# Patient Record
Sex: FEMALE | Race: WHITE | URBAN - METROPOLITAN AREA
[De-identification: names, ages, dates, MRNs, and addresses within clinical notes are randomized per-mention and may not be internally consistent; named-entity substitution may affect disease eponyms.]

---

## 2017-01-31 ENCOUNTER — TELEPHONE (OUTPATIENT)
Dept: FAMILY MEDICINE | Facility: CLINIC | Age: 72
End: 2017-01-31

## 2017-01-31 DIAGNOSIS — E78.5 HYPERLIPIDEMIA LDL GOAL <100: Primary | ICD-10-CM

## 2017-01-31 NOTE — TELEPHONE ENCOUNTER
Reason for call: Medication   If this is a refill request, has the caller requested the refill from the pharmacy already? Yes  Will the patient be using a [facility]? No  Name of the pharmacy and phone number for the current request: [facility] [phone]    Name of the medication requested: simvastatin 40 mg insurance is not covering this medication and was told by pharmacy to contact provider to prescribed an alternative medication     Other request:     Phone Number Pt can be reached at: Cell number on file:    Telephone Information:   Mobile [phone]     Best Time: anytime  Can we leave a detailed message on this number? YES

## 2017-01-31 NOTE — TELEPHONE ENCOUNTER
Called patient and requested patient call insurance for alternative formulary options and return call to clinic - provider then will provider opinion on best option    Katie Bowen RN

## 2017-01-31 NOTE — TELEPHONE ENCOUNTER
Spoke with patient; she mentioned her insurance company will allow the prescription to be filled with a 's prior authorization regarding simvastatin. Please call patient back to re-verify

## 2017-02-01 NOTE — TELEPHONE ENCOUNTER
"Discussed with Dr Wegener, call patient to see what other medications are covered.     Staff called and spoke with patient, relayed message. Patient states she called and spoke with her insurance already. Her insurance states \"Dr Wegener needs to call [phone] saying that patient needs to take Simvastatin and the medication will be approved for 1 year, #45 per month.\"    Routing to PCP. Please advise, do you want staff to call patient's insurance?    Concepcion Yanes MA      "

## 2017-02-01 NOTE — TELEPHONE ENCOUNTER
Routing to [facility] and [place] pool.    Dr. Wegener-Please review.  Would you like prior auth to be started for Simvastatin 40 mg?    MA-If indicated by Dr. Wegener, please start prior auth.    Thank you!  SHE PatelN, RN

## 2017-02-02 RX ORDER — SIMVASTATIN 40 MG
40 TABLET ORAL DAILY
Qty: 90 TABLET | Refills: 3 | Status: SHIPPED | OUTPATIENT
Start: 2017-02-02 | End: 2017-09-29

## 2017-02-02 NOTE — TELEPHONE ENCOUNTER
I called and discussed with the patient.     It appears it is a quantity limit since taking two one day and one the other.     Reviewed last lipids which were excellent, no h/o MI or stroke.     Will change dose to 40mg daily and see if prior auth still needed.     Joel Wegener,MD

## 2017-04-27 DIAGNOSIS — R00.2 PALPITATIONS: ICD-10-CM

## 2017-04-27 NOTE — TELEPHONE ENCOUNTER
metoprolol (LOPRESSOR) 25 MG tablet      Last Written Prescription Date: 4/11/16  Last Fill Quantity: 90, # refills: 3    Last Office Visit with [facility], [facility] or [facility] prescribing provider:  9/20/16   Future Office Visit:        BP Readings from Last 3 Encounters:   09/20/16 116/76   02/02/16 118/78   10/26/15 106/75

## 2017-04-28 RX ORDER — METOPROLOL TARTRATE 25 MG/1
TABLET, FILM COATED ORAL
Qty: 90 TABLET | Refills: 0 | Status: SHIPPED | OUTPATIENT
Start: 2017-04-28 | End: 2017-08-01

## 2017-07-21 DIAGNOSIS — E78.5 HYPERLIPIDEMIA LDL GOAL <100: ICD-10-CM

## 2017-07-21 RX ORDER — SIMVASTATIN 40 MG
TABLET ORAL
Qty: 30 TABLET | Refills: 0 | Status: SHIPPED | OUTPATIENT
Start: 2017-07-21 | End: 2017-08-26

## 2017-07-21 NOTE — TELEPHONE ENCOUNTER
Medication is being filled for 1 time refill only due to:  IN NEED OF PREVENTATIVE PHYSICAL AND LABS. Sabra Silva RN

## 2017-08-01 DIAGNOSIS — R00.2 PALPITATIONS: ICD-10-CM

## 2017-08-02 NOTE — TELEPHONE ENCOUNTER
metoprolol (LOPRESSOR) 25 MG tablet      Last Written Prescription Date: 04/28/17  Last Fill Quantity: 90, # refills: 0    Last Office Visit with [facility], [facility] or [facility] prescribing provider:  09/20/16   Future Office Visit:        BP Readings from Last 3 Encounters:   09/20/16 116/76   02/02/16 118/78   10/26/15 106/75

## 2017-08-03 RX ORDER — METOPROLOL TARTRATE 25 MG/1
TABLET, FILM COATED ORAL
Qty: 90 TABLET | Refills: 0 | Status: SHIPPED | OUTPATIENT
Start: 2017-08-03 | End: 2017-09-29

## 2017-08-03 NOTE — TELEPHONE ENCOUNTER
Routing refill request to provider for review/approval because:  -Associated diagnosis of palpitations not on [facility] refill protocol for this medication      Dr. Wegener-Please sign if agree.    Team coordinators-Please contact patient to schedule annual physical in September 2017.    Thank you!  SHE PatelN, RN

## 2017-08-26 DIAGNOSIS — E78.5 HYPERLIPIDEMIA LDL GOAL <100: ICD-10-CM

## 2017-08-28 RX ORDER — SIMVASTATIN 40 MG
TABLET ORAL
Qty: 30 TABLET | Refills: 0 | Status: SHIPPED | OUTPATIENT
Start: 2017-08-28 | End: 2017-09-29

## 2017-08-28 NOTE — TELEPHONE ENCOUNTER
Medication Detail      Disp Refills Start End FRANSICO   simvastatin (ZOCOR) 40 MG tablet 30 tablet 0 7/21/2017  No   Sig: TAKE 1 TAB BY MOUTH NIGHTLY ALTERNATING WITH 2 TABS NIGHTLY -1 ONE NIGHT, 2 THE NEXT, 1 THE NEXT ETC       Last Office Visit with [facility], [facility] or [facility] prescribing provider: 9/20/16       Lab Results   Component Value Date    CHOL 184 09/12/2014     Lab Results   Component Value Date    HDL 66 09/12/2014     Lab Results   Component Value Date    LDL 84 09/12/2014     Lab Results   Component Value Date    TRIG 168 09/12/2014     Lab Results   Component Value Date    CHOLHDLRATIO 2.8 09/12/2014

## 2017-08-28 NOTE — TELEPHONE ENCOUNTER
Routing refill request to provider for review/approval because:  Labs not current:  Lipid panel  -Marianne refill given and patient did not schedule office visit    Dr. Wegener-Please sign if agree.    Team coordinators-Please contact patient to schedule annual physical.    Thank you!  SHE PatelN, RN

## 2017-09-29 ENCOUNTER — OFFICE VISIT (OUTPATIENT)
Dept: FAMILY MEDICINE | Facility: CLINIC | Age: 72
End: 2017-09-29
Payer: MEDICARE

## 2017-09-29 VITALS
RESPIRATION RATE: 22 BRPM | SYSTOLIC BLOOD PRESSURE: 115 MMHG | TEMPERATURE: 99.1 F | BODY MASS INDEX: 38.24 KG/M2 | WEIGHT: 229.5 LBS | HEART RATE: 56 BPM | DIASTOLIC BLOOD PRESSURE: 66 MMHG | HEIGHT: 65 IN

## 2017-09-29 DIAGNOSIS — Z91.81 AT RISK FOR FALLING: ICD-10-CM

## 2017-09-29 DIAGNOSIS — R00.2 PALPITATIONS: ICD-10-CM

## 2017-09-29 DIAGNOSIS — Z00.01 ENCOUNTER FOR GENERAL ADULT MEDICAL EXAMINATION WITH ABNORMAL FINDINGS: Primary | ICD-10-CM

## 2017-09-29 DIAGNOSIS — M85.80 OSTEOPENIA, UNSPECIFIED LOCATION: ICD-10-CM

## 2017-09-29 DIAGNOSIS — Z78.0 ASYMPTOMATIC POSTMENOPAUSAL STATUS: ICD-10-CM

## 2017-09-29 DIAGNOSIS — Z78.0 MENOPAUSE: ICD-10-CM

## 2017-09-29 DIAGNOSIS — Z23 NEED FOR PROPHYLACTIC VACCINATION AND INOCULATION AGAINST INFLUENZA: ICD-10-CM

## 2017-09-29 DIAGNOSIS — E78.5 HYPERLIPIDEMIA LDL GOAL <100: ICD-10-CM

## 2017-09-29 LAB
ALBUMIN SERPL-MCNC: 3.9 G/DL (ref 3.4–5)
ALP SERPL-CCNC: 57 U/L (ref 40–150)
ALT SERPL W P-5'-P-CCNC: 23 U/L (ref 0–50)
ANION GAP SERPL CALCULATED.3IONS-SCNC: 7 MMOL/L (ref 3–14)
AST SERPL W P-5'-P-CCNC: 10 U/L (ref 0–45)
BILIRUB SERPL-MCNC: 0.7 MG/DL (ref 0.2–1.3)
BUN SERPL-MCNC: 19 MG/DL (ref 7–30)
CALCIUM SERPL-MCNC: 9.5 MG/DL (ref 8.5–10.1)
CHLORIDE SERPL-SCNC: 107 MMOL/L (ref 94–109)
CHOLEST SERPL-MCNC: 178 MG/DL
CO2 SERPL-SCNC: 28 MMOL/L (ref 20–32)
CREAT SERPL-MCNC: 0.74 MG/DL (ref 0.52–1.04)
GFR SERPL CREATININE-BSD FRML MDRD: 77 ML/MIN/1.7M2
GLUCOSE SERPL-MCNC: 104 MG/DL (ref 70–99)
HDLC SERPL-MCNC: 57 MG/DL
LDLC SERPL CALC-MCNC: 87 MG/DL
NONHDLC SERPL-MCNC: 121 MG/DL
POTASSIUM SERPL-SCNC: 4.2 MMOL/L (ref 3.4–5.3)
PROT SERPL-MCNC: 7.1 G/DL (ref 6.8–8.8)
SODIUM SERPL-SCNC: 142 MMOL/L (ref 133–144)
TRIGL SERPL-MCNC: 170 MG/DL

## 2017-09-29 PROCEDURE — 90662 IIV NO PRSV INCREASED AG IM: CPT | Performed by: FAMILY MEDICINE

## 2017-09-29 PROCEDURE — G0439 PPPS, SUBSEQ VISIT: HCPCS | Performed by: FAMILY MEDICINE

## 2017-09-29 PROCEDURE — 80061 LIPID PANEL: CPT | Performed by: FAMILY MEDICINE

## 2017-09-29 PROCEDURE — 36415 COLL VENOUS BLD VENIPUNCTURE: CPT | Performed by: FAMILY MEDICINE

## 2017-09-29 PROCEDURE — 80053 COMPREHEN METABOLIC PANEL: CPT | Performed by: FAMILY MEDICINE

## 2017-09-29 PROCEDURE — G0008 ADMIN INFLUENZA VIRUS VAC: HCPCS | Performed by: FAMILY MEDICINE

## 2017-09-29 RX ORDER — METOPROLOL TARTRATE 25 MG/1
25 TABLET, FILM COATED ORAL DAILY
Qty: 90 TABLET | Refills: 3 | Status: SHIPPED | OUTPATIENT
Start: 2017-09-29 | End: 2018-11-02

## 2017-09-29 RX ORDER — SIMVASTATIN 40 MG
40 TABLET ORAL DAILY
Qty: 90 TABLET | Refills: 3 | Status: SHIPPED | OUTPATIENT
Start: 2017-09-29 | End: 2018-10-26

## 2017-09-29 NOTE — PROGRESS NOTES
SUBJECTIVE:   Noemí Syed is a 72 year old female who presents for Preventive Visit.      Are you in the first 12 months of your Medicare Part B coverage?  No    Answers for HPI/ROS submitted by the patient on 9/29/2017   Annual Exam:  Getting at least 3 servings of Calcium per day:: Yes  Bi-annual eye exam:: Yes  Dental care twice a year:: Yes  Sleep apnea or symptoms of sleep apnea:: None  Diet:: Regular (no restrictions)  Frequency of exercise:: 2-3 days/week  Taking medications regularly:: Yes  Medication side effects:: None  Additional concerns today:: YES  PHQ-2 Score: 0  Duration of exercise:: Less than 15 minutes         COGNITIVE SCREEN  1) Repeat 3 items (Banana, Sunrise, Chair)      2) Clock draw:   NORMAL  3) 3 item recall: Recalls 3 objects  Results: NORMAL clock, 1-2 items recalled: COGNITIVE IMPAIRMENT LESS LIKELY    Mini-CogTM Copyright [employer]. Licensed by the author for use in [facility]; reprinted with permission ([email or web address]). All rights reserved.                -------------------------------------    Reviewed and updated as needed this visit by clinical staff  Tobacco  Allergies  Med Hx  Surg Hx  Fam Hx  Soc Hx        Reviewed and updated as needed this visit by Provider        Social History   Substance Use Topics     Smoking status: Never Smoker     Smokeless tobacco: Never Used     Alcohol use Yes      Comment: 1 or twice a month       The patient does not drink >3 drinks per day nor >7 drinks per week.    Today's PHQ-2 Score:   PHQ-2 ( 1999 Pf[employer]) 9/29/2017 9/29/2017   Q1: Little interest or pleasure in doing things 0 0   Q2: Feeling down, depressed or hopeless 0 0   PHQ-2 Score 0 0   Q1: Little interest or pleasure in doing things Not at all -   Q2: Feeling down, depressed or hopeless Not at all -   PHQ-2 Score 0 -       Do you feel safe in your environment - Yes    Do you have a Health Care Directive?: No: Advance care planning was reviewed with patient;  "patient declined at this time.      Current providers sharing in care for this patient include: Patient Care Team:  Wegener, Joel Daniel Irwin, MD as PCP - General (Family Practice)  Glynn Martinez MD as MD (Family Practice)  Jac Verdin MD as MD (Colon and Rectal Surgery)      Hearing impairment: Yes, have earring aids    Ability to successfully perform activities of daily living: Yes, no assistance needed     Fall risk:  Fallen 2 or more times in the past year?: No  Any fall with injury in the past year?: No      Home safety:  none identified  click delete button to remove this line now    The following health maintenance items are reviewed in Epic and correct as of today:  Health Maintenance   Topic Date Due     MICROALBUMIN Q1 YEAR  08/08/2012     LIPID MONITORING Q1 YEAR  09/12/2015     DEXA Q3 YR  03/12/2016     BMP Q1 YR  02/02/2017     MEDICARE ANNUAL WELLNESS VISIT  02/02/2017     INFLUENZA VACCINE (SYSTEM ASSIGNED)  09/01/2017     FALL RISK ASSESSMENT  09/20/2017     ADVANCE DIRECTIVE PLANNING Q5 YRS  10/01/2017     MAMMO Q2 YR  07/07/2018     TETANUS Q10 YR  08/29/2022     COLONOSCOPY Q10 YR  01/15/2025     PNEUMOCOCCAL  Completed     Labs reviewed in EPIC        ROS:  Constitutional, HEENT, cardiovascular, pulmonary, GI, , musculoskeletal, neuro, skin, endocrine and psych systems are negative, except as otherwise noted.      OBJECTIVE:   /66 (BP Location: Right arm, Patient Position: Chair, Cuff Size: Adult Large)  Pulse 56  Temp 99.1  F (37.3  C) (Oral)  Resp 22  Ht 5' 5\" (1.651 m)  Wt 229 lb 8 oz (104.1 kg)  BMI 38.19 kg/m2 Estimated body mass index is 38.19 kg/(m^2) as calculated from the following:    Height as of this encounter: 5' 5\" (1.651 m).    Weight as of this encounter: 229 lb 8 oz (104.1 kg).  EXAM:   GENERAL: healthy, alert and no distress  EYES: Eyes grossly normal to inspection, PERRL and conjunctivae and sclerae normal  HENT: ear canals and TM's normal, nose and " mouth without ulcers or lesions  NECK: no adenopathy, no asymmetry, masses, or scars and thyroid normal to palpation  RESP: lungs clear to auscultation - no rales, rhonchi or wheezes  BREAST: normal without masses, tenderness or nipple discharge and no palpable axillary masses or adenopathy  CV: regular rate and rhythm, normal S1 S2, no S3 or S4, no murmur, click or rub, no peripheral edema and peripheral pulses strong  ABDOMEN: soft, nontender, no hepatosplenomegaly, no masses and bowel sounds normal  MS: no gross musculoskeletal defects noted, no edema  SKIN: no suspicious lesions or rashes  NEURO: Normal strength and tone, mentation intact and speech normal  PSYCH: mentation appears normal, affect normal/bright    ASSESSMENT / PLAN:   1. Encounter for general adult medical examination with abnormal findings  Flu shot today.  Up to date on colon cancer screening.  Mammogram due next year.  Gave advanced directive packet to review/return.  Pneumonia vaccine up to date.     2. Osteopenia, unspecified location  Due for dexa, will schedule today.   - DX Hip/Pelvis/Spine; Future    3. Hyperlipidemia LDL goal <100  Check lab after dose adjustment.   - Lipid panel reflex to direct LDL  - Comprehensive metabolic panel  - simvastatin (ZOCOR) 40 MG tablet; Take 1 tablet (40 mg) by mouth daily  Dispense: 90 tablet; Refill: 3    4. Palpitations  Will plan to follow up with cardiology since has been nearly 4 years with two year follow up recommended last visit.  Stable by symptoms however.     - Comprehensive metabolic panel  - metoprolol (LOPRESSOR) 25 MG tablet; Take 1 tablet (25 mg) by mouth daily  Dispense: 90 tablet; Refill: 3    5. Menopause    - DX Hip/Pelvis/Spine; Future    6. Asymptomatic postmenopausal status      7. At risk for falling  No falls, working with a  for strengthening/conditioning.     8. Need for prophylactic vaccination and inoculation against influenza  Today.       End of Life  "Planning:  Patient currently has an advanced directive: No.  I have verified the patient's ablity to prepare an advanced directive/make health care decisions.  Literature was provided to assist patient in preparing an advanced directive.    COUNSELING:  Reviewed preventive health counseling, as reflected in patient instructions       Regular exercise       Healthy diet/nutrition       Colon cancer screening        Estimated body mass index is 38.19 kg/(m^2) as calculated from the following:    Height as of this encounter: 5' 5\" (1.651 m).    Weight as of this encounter: 229 lb 8 oz (104.1 kg).  Weight management plan: Discussed healthy diet and exercise guidelines and patient will follow up in 12 months in clinic to re-evaluate.   reports that she has never smoked. She has never used smokeless tobacco.        Appropriate preventive services were discussed with this patient, including applicable screening as appropriate for cardiovascular disease, diabetes, osteopenia/osteoporosis, and glaucoma.  As appropriate for age/gender, discussed screening for colorectal cancer, prostate cancer, breast cancer, and cervical cancer. Checklist reviewing preventive services available has been given to the patient.    Reviewed patients plan of care and provided an AVS. The Basic Care Plan (routine screening as documented in Health Maintenance) for Noemí meets the Care Plan requirement. This Care Plan has been established and reviewed with the Patient.    Counseling Resources:  ATP IV Guidelines  Pooled Cohorts Equation Calculator  Breast Cancer Risk Calculator  FRAX Risk Assessment  ICSI Preventive Guidelines  Dietary Guidelines for Americans, 2010  USDA's MyPlate  ASA Prophylaxis  Lung CA Screening    Joel Daniel Wegener, MD  [facility]  "

## 2017-09-29 NOTE — NURSING NOTE
"  Chief Complaint   Patient presents with     Physical       Initial /66 (BP Location: Right arm, Patient Position: Chair, Cuff Size: Adult Large)  Pulse 56  Temp 99.1  F (37.3  C) (Oral)  Resp 22  Ht 5' 5\" (1.651 m)  Wt 229 lb 8 oz (104.1 kg)  BMI 38.19 kg/m2 Estimated body mass index is 38.19 kg/(m^2) as calculated from the following:    Height as of this encounter: 5' 5\" (1.651 m).    Weight as of this encounter: 229 lb 8 oz (104.1 kg).  Medication Reconciliation: complete Zach Syed      1.  Has the patient received the information for the influenza vaccine? YES    2.  Does the patient have any of the following contraindications?     Allergy to eggs? No     Allergic reaction to previous influenza vaccines? No     Any other problems to previous influenza vaccines? No     Paralyzed by Guillain-Packwood syndrome? No     Currently pregnant? NO     Current moderate or severe illness? No     Allergy to contact lens solution? No    3.  The vaccine has been administered in the usual fashion and the patient was instructed to wait 20 minutes before leaving the building in the event of an allergic reaction: YES    Vaccination given by Zach Yanes MA  .  Recorded by Zach Yanes          "

## 2017-09-29 NOTE — MR AVS SNAPSHOT
After Visit Summary   9/29/2017    Noemí Syed    MRN: [id number]           Patient Information     Date Of Birth          1945        Visit Information        Provider Department      9/29/2017 8:20 AM Wegener, Joel Daniel Irwin, MD [facility]        Today's Diagnoses     Encounter for general adult medical examination with abnormal findings    -  1    Osteopenia, unspecified location        Hyperlipidemia LDL goal <100        Palpitations        Menopause        Asymptomatic postmenopausal status        At risk for falling        Need for prophylactic vaccination and inoculation against influenza          Care Instructions      Preventive Health Recommendations    Female Ages 65 +    Yearly exam:     See your health care provider every year in order to  o Review health changes.   o Discuss preventive care.    o Review your medicines if your doctor has prescribed any.      You no longer need a yearly Pap test unless you've had an abnormal Pap test in the past 10 years. If you have vaginal symptoms, such as bleeding or discharge, be sure to talk with your provider about a Pap test.      Every 1 to 2 years, have a mammogram.  If you are over 69, talk with your health care provider about whether or not you want to continue having screening mammograms.      Every 10 years, have a colonoscopy. Or, have a yearly FIT test (stool test). These exams will check for colon cancer.       Have a cholesterol test every 5 years, or more often if your doctor advises it.       Have a diabetes test (fasting glucose) every three years. If you are at risk for diabetes, you should have this test more often.       At age 65, have a bone density scan (DEXA) to check for osteoporosis (brittle bone disease).    Shots:    Get a flu shot each year.    Get a tetanus shot every 10 years.    Talk to your doctor about your pneumonia vaccines. There are now two you should receive - Pneumovax (PPSV 23) and  Prevnar (PCV 13).    Talk to your doctor about the shingles vaccine.    Talk to your doctor about the hepatitis B vaccine.    Nutrition:     Eat at least 5 servings of fruits and vegetables each day.      Eat whole-grain bread, whole-wheat pasta and brown rice instead of white grains and rice.      Talk to your provider about Calcium and Vitamin D.     Lifestyle    Exercise at least 150 minutes a week (30 minutes a day, 5 days a week). This will help you control your weight and prevent disease.      Limit alcohol to one drink per day.      No smoking.       Wear sunscreen to prevent skin cancer.       See your dentist twice a year for an exam and cleaning.      See your eye doctor every 1 to 2 years to screen for conditions such as glaucoma, macular degeneration and cataracts.          Follow-ups after your visit        Future tests that were ordered for you today     Open Future Orders        Priority Expected Expires Ordered    DX Hip/Pelvis/Spine Routine  9/29/2018 9/29/2017            Who to contact     If you have questions or need follow up information about today's clinic visit or your schedule please contact [facility] directly at [phone].  Normal or non-critical lab and imaging results will be communicated to you by [employer], letter or phone within 4 business days after the clinic has received the results. If you do not hear from us within 7 days, please contact the clinic through [employer] or phone. If you have a critical or abnormal lab result, we will notify you by phone as soon as possible.  Submit refill requests through [employer] or call your pharmacy and they will forward the refill request to us. Please allow 3 business days for your refill to be completed.          Additional Information About Your Visit        [employer] Information     [employer] lets you send messages to your doctor, view your test results, renew your prescriptions, schedule appointments and more. To sign up, go to  "[email or web address] . Click on \"Log in\" on the left side of the screen, which will take you to the Welcome page. Then click on \"Sign up Now\" on the right side of the page.     You will be asked to enter the access code listed below, as well as some personal information. Please follow the directions to create your username and password.     Your access code is: [email or web address]  Expires: 2017  9:23 AM     Your access code will  in 90 days. If you need help or a new code, please call your [facility] clinic or [phone].        Care EveryWhere ID     This is your Care EveryWhere ID. This could be used by other organizations to access your [facility] medical records  [id number]        Your Vitals Were     Pulse Temperature Respirations Height BMI (Body Mass Index)       56 99.1  F (37.3  C) (Oral) 22 5' 5\" (1.651 m) 38.19 kg/m2        Blood Pressure from Last 3 Encounters:   17 115/66   16 116/76   16 118/78    Weight from Last 3 Encounters:   17 229 lb 8 oz (104.1 kg)   16 225 lb (102.1 kg)   16 217 lb (98.4 kg)              We Performed the Following     Comprehensive metabolic panel     Lipid panel reflex to direct LDL          Today's Medication Changes          These changes are accurate as of: 17  9:23 AM.  If you have any questions, ask your nurse or doctor.               These medicines have changed or have updated prescriptions.        Dose/Directions    metoprolol 25 MG tablet   Commonly known as:  LOPRESSOR   This may have changed:  See the new instructions.   Used for:  Palpitations   Changed by:  Wegener, Joel Daniel Irwin, MD        Dose:  25 mg   Take 1 tablet (25 mg) by mouth daily   Quantity:  90 tablet   Refills:  3            Where to get your medicines      These medications were sent to [facility]/pharmacy #[id number] - [place], [place] - [address]  [address], [place] [address]     Phone:  [phone]     metoprolol 25 MG tablet    simvastatin " 40 MG tablet                Primary Care Provider Office Phone # Fax #    Nilesh Brock Irwin Wegener, [phone] [address] [place] [address]        Equal Access to Services     PERCYOMAYRA ANGEL : Alberto ingris mccabe saurabho Sojamariali, waaxda luqadaha, qaybta kaalmada adeegyada, joan lima laDannisonu dunn. So [facility] [phone].    ATENCIÓN: Si habla español, tiene a sepulveda disposición servicios gratuitos de asistencia lingüística. Llame al [phone].    We comply with applicable federal civil rights laws and [place] laws. We do not discriminate on the basis of race, color, national origin, age, disability sex, sexual orientation or gender identity.            Thank you!     Thank you for choosing [facility]  for your care. Our goal is always to provide you with excellent care. Hearing back from our patients is one way we can continue to improve our services. Please take a few minutes to complete the written survey that you may receive in the mail after your visit with us. Thank you!             Your Updated Medication List - Protect others around you: Learn how to safely use, store and throw away your medicines at www.disposemymeds.org.          This list is accurate as of: 9/29/17  9:23 AM.  Always use your most recent med list.                   Brand Name Dispense Instructions for use Diagnosis    aspirin 325 MG EC tablet     100 Tab    ONE DAILY (as Excedrin daily or baby aspirin)    Hyperlipemia, mixed       DAILY MULTIVITAMIN PO      Take 1 tablet by mouth daily        Fish Oil 500 MG Caps     120    2 tablets twice daily    Other and unspecified hyperlipidemia       GLUCOSAMINE-CHONDROITIN PO      Take 1 tablet by mouth daily        METAMUCIL 0.52 G capsule   Generic drug:  psyllium     0    3 capsules  in the evening and 2 tablets in the am    Slow transit constipation, External hemorrhoids without mention of complication       metoprolol 25 MG tablet     LOPRESSOR    90 tablet    Take 1 tablet (25 mg) by mouth daily    Palpitations       simvastatin 40 MG tablet    ZOCOR    90 tablet    Take 1 tablet (40 mg) by mouth daily    Hyperlipidemia LDL goal <100       VITAMIN B-12 PO      1 tab daily

## 2017-09-29 NOTE — LETTER
October 3, 2017      Noemí Syed  [address] [facility] [address]        Dear ,    We are writing to inform you of your test results.    The cholesterol was in an ok range so I wouldn't change the atorvastatin dose. The blood sugar just slightly in prediabetic range and the other labs were normal.    Resulted Orders   Lipid panel reflex to direct LDL   Result Value Ref Range    Cholesterol 178 <200 mg/dL    Triglycerides 170 (H) <150 mg/dL      Comment:      Borderline high:  150-199 mg/dl  High:             200-499 mg/dl  Very high:       >499 mg/dl  Fasting specimen      HDL Cholesterol 57 >49 mg/dL    LDL Cholesterol Calculated 87 <100 mg/dL      Comment:      Desirable:       <100 mg/dl    Non HDL Cholesterol 121 <130 mg/dL   Comprehensive metabolic panel   Result Value Ref Range    Sodium 142 133 - 144 mmol/L    Potassium 4.2 3.4 - 5.3 mmol/L    Chloride 107 94 - 109 mmol/L    Carbon Dioxide 28 20 - 32 mmol/L    Anion Gap 7 3 - 14 mmol/L    Glucose 104 (H) 70 - 99 mg/dL      Comment:      Fasting specimen    Urea Nitrogen 19 7 - 30 mg/dL    Creatinine 0.74 0.52 - 1.04 mg/dL    GFR Estimate 77 >60 mL/min/1.7m2      Comment:      Non  GFR Calc    GFR Estimate If Black >90 >60 mL/min/1.7m2      Comment:       GFR Calc    Calcium 9.5 8.5 - 10.1 mg/dL    Bilirubin Total 0.7 0.2 - 1.3 mg/dL    Albumin 3.9 3.4 - 5.0 g/dL    Protein Total 7.1 6.8 - 8.8 g/dL    Alkaline Phosphatase 57 40 - 150 U/L    ALT 23 0 - 50 U/L    AST 10 0 - 45 U/L       If you have any questions or concerns, please call the clinic at the number listed above.       Sincerely,        Joel Daniel Wegener, MD/nr

## 2017-10-04 ENCOUNTER — RADIANT APPOINTMENT (OUTPATIENT)
Dept: BONE DENSITY | Facility: CLINIC | Age: 72
End: 2017-10-04
Attending: FAMILY MEDICINE
Payer: MEDICARE

## 2017-10-04 DIAGNOSIS — M85.80 OSTEOPENIA, UNSPECIFIED LOCATION: ICD-10-CM

## 2017-10-04 DIAGNOSIS — Z78.0 MENOPAUSE: ICD-10-CM

## 2017-10-04 PROCEDURE — 77085 DXA BONE DENSITY AXL VRT FX: CPT | Performed by: INTERNAL MEDICINE

## 2018-10-26 DIAGNOSIS — E78.5 HYPERLIPIDEMIA LDL GOAL <100: ICD-10-CM

## 2018-10-26 NOTE — TELEPHONE ENCOUNTER
"Requested Prescriptions   Pending Prescriptions Disp Refills     simvastatin (ZOCOR) 40 MG tablet [Pharmacy Med Name: SIMVASTATIN 40 MG TABLET] 90 tablet 3     Sig: TAKE 1 TABLET (40 MG) BY MOUTH DAILY    Statins Protocol Failed    10/26/2018  1:29 AM       Failed - LDL on file in past 12 months    Recent Labs   Lab Test  09/29/17   0830   LDL  87            Failed - Recent (12 mo) or future (30 days) visit within the authorizing provider's specialty    Patient had office visit in the last 12 months or has a visit in the next 30 days with authorizing provider or within the authorizing provider's specialty.  See \"Patient Info\" tab in inbasket, or \"Choose Columns\" in Meds & Orders section of the refill encounter.             Passed - No abnormal creatine kinase in past 12 months    No lab results found.            Passed - Patient is age 18 or older       Passed - No active pregnancy on record       Passed - No positive pregnancy test in past 12 months        Last Written Prescription Date:  9/29/17  Last Fill Quantity: 90,  # refills: 3   Last office visit: 9/29/2017 with prescribing provider:  WEGENER   Future Office Visit:      "

## 2018-10-29 RX ORDER — SIMVASTATIN 40 MG
TABLET ORAL
Qty: 30 TABLET | Refills: 0 | Status: SHIPPED | OUTPATIENT
Start: 2018-10-29 | End: 2018-11-26

## 2018-10-29 NOTE — TELEPHONE ENCOUNTER
Call patient to schedule office visit.  Patient has not been seen in clinic for greater than 1 year.  1 month sent  Yolanda Rodgers RN

## 2018-11-02 DIAGNOSIS — R00.2 PALPITATIONS: ICD-10-CM

## 2018-11-02 NOTE — TELEPHONE ENCOUNTER
"Requested Prescriptions   Pending Prescriptions Disp Refills     metoprolol tartrate (LOPRESSOR) 25 MG tablet [Pharmacy Med Name: METOPROLOL TARTRATE 25 MG TAB]  Last Written Prescription Date:  9/29/2017  Last Fill Quantity: 90 tabs,  # refills: 3   Last office visit: 9/29/2017 with prescribing provider:  Wegener   Future Office Visit:   Next 5 appointments (look out 90 days)     Nov 26, 2018 10:00 AM CST   PHYSICAL with Joel Daniel Irwin Wegener, MD   [facility]    [address] [place] [id number] [phone]                  90 tablet 3     Sig: TAKE 1 TABLET (25 MG) BY MOUTH DAILY    Beta-Blockers Protocol Failed    11/2/2018  1:29 AM       Failed - Blood pressure under 140/90 in past 12 months    BP Readings from Last 3 Encounters:   09/29/17 115/66   09/20/16 116/76   02/02/16 118/78                Passed - Patient is age 6 or older       Passed - Recent (12 mo) or future (30 days) visit within the authorizing provider's specialty    Patient had office visit in the last 12 months or has a visit in the next 30 days with authorizing provider or within the authorizing provider's specialty.  See \"Patient Info\" tab in inbasket, or \"Choose Columns\" in Meds & Orders section of the refill encounter.                "

## 2018-11-05 RX ORDER — METOPROLOL TARTRATE 25 MG/1
TABLET, FILM COATED ORAL
Qty: 30 TABLET | Refills: 0 | Status: SHIPPED | OUTPATIENT
Start: 2018-11-05 | End: 2018-11-26

## 2018-11-05 NOTE — TELEPHONE ENCOUNTER
Medication is being filled for 1 time refill only due to:  Patient needs to be seen because it has been more than one year since last visit.     Signed Prescriptions:                        Disp   Refills    metoprolol tartrate (LOPRESSOR) 25 MG tabl*30 tab*0        Sig: TAKE 1 TABLET (25 MG) BY MOUTH DAILY  Authorizing Provider: WEGENER, JOEL DANIEL IRWIN  Ordering User: ANGELA REYES      Routing to  Reception - one month given - due for annual office visit     Thank you,  Angela Reyes RN

## 2018-11-26 ENCOUNTER — OFFICE VISIT (OUTPATIENT)
Dept: FAMILY MEDICINE | Facility: CLINIC | Age: 73
End: 2018-11-26
Payer: MEDICARE

## 2018-11-26 VITALS
BODY MASS INDEX: 40.93 KG/M2 | DIASTOLIC BLOOD PRESSURE: 71 MMHG | TEMPERATURE: 98.3 F | SYSTOLIC BLOOD PRESSURE: 108 MMHG | RESPIRATION RATE: 14 BRPM | HEART RATE: 61 BPM | WEIGHT: 231 LBS | HEIGHT: 63 IN | OXYGEN SATURATION: 94 %

## 2018-11-26 DIAGNOSIS — I47.10 SVT (SUPRAVENTRICULAR TACHYCARDIA) (H): ICD-10-CM

## 2018-11-26 DIAGNOSIS — E66.01 MORBID OBESITY (H): ICD-10-CM

## 2018-11-26 DIAGNOSIS — R73.01 ELEVATED FASTING BLOOD SUGAR: ICD-10-CM

## 2018-11-26 DIAGNOSIS — E78.5 HYPERLIPIDEMIA LDL GOAL <100: ICD-10-CM

## 2018-11-26 DIAGNOSIS — Z79.899 MEDICATION MANAGEMENT: ICD-10-CM

## 2018-11-26 DIAGNOSIS — R00.2 PALPITATIONS: ICD-10-CM

## 2018-11-26 DIAGNOSIS — Z13.1 SCREENING FOR DIABETES MELLITUS: ICD-10-CM

## 2018-11-26 DIAGNOSIS — Z00.00 MEDICARE ANNUAL WELLNESS VISIT, SUBSEQUENT: Primary | ICD-10-CM

## 2018-11-26 LAB — HBA1C MFR BLD: 5.7 % (ref 0–5.6)

## 2018-11-26 PROCEDURE — 83036 HEMOGLOBIN GLYCOSYLATED A1C: CPT | Performed by: FAMILY MEDICINE

## 2018-11-26 PROCEDURE — G0439 PPPS, SUBSEQ VISIT: HCPCS | Performed by: FAMILY MEDICINE

## 2018-11-26 PROCEDURE — 36415 COLL VENOUS BLD VENIPUNCTURE: CPT | Performed by: FAMILY MEDICINE

## 2018-11-26 PROCEDURE — 80061 LIPID PANEL: CPT | Performed by: FAMILY MEDICINE

## 2018-11-26 PROCEDURE — 82043 UR ALBUMIN QUANTITATIVE: CPT | Performed by: FAMILY MEDICINE

## 2018-11-26 PROCEDURE — 80053 COMPREHEN METABOLIC PANEL: CPT | Performed by: FAMILY MEDICINE

## 2018-11-26 RX ORDER — METOPROLOL TARTRATE 25 MG/1
TABLET, FILM COATED ORAL
Qty: 90 TABLET | Refills: 3 | Status: SHIPPED | OUTPATIENT
Start: 2018-11-26 | End: 2019-11-29

## 2018-11-26 RX ORDER — SIMVASTATIN 40 MG
TABLET ORAL
Qty: 90 TABLET | Refills: 3 | Status: SHIPPED | OUTPATIENT
Start: 2018-11-26 | End: 2019-11-22

## 2018-11-26 NOTE — TELEPHONE ENCOUNTER
"Requested Prescriptions   Pending Prescriptions Disp Refills     simvastatin (ZOCOR) 40 MG tablet [Pharmacy Med Name: SIMVASTATIN 40 MG TABLET]  Last Written Prescription Date:  11/26/18  Last Fill Quantity: 90 TABLET,  # refills: 3   Last office visit: 11/26/2018 with prescribing provider:  WEGENER   Future Office Visit:     30 tablet 0     Sig: TAKE 1 TABLET BY MOUTH EVERY DAY    Statins Protocol Failed    11/26/2018  1:41 AM       Failed - LDL on file in past 12 months    Recent Labs   Lab Test  09/29/17   0830   LDL  87            Passed - No abnormal creatine kinase in past 12 months    No lab results found.            Passed - Recent (12 mo) or future (30 days) visit within the authorizing provider's specialty    Patient had office visit in the last 12 months or has a visit in the next 30 days with authorizing provider or within the authorizing provider's specialty.  See \"Patient Info\" tab in inbasket, or \"Choose Columns\" in Meds & Orders section of the refill encounter.             Passed - Patient is age 18 or older       Passed - No active pregnancy on record       Passed - No positive pregnancy test in past 12 months          "

## 2018-11-26 NOTE — MR AVS SNAPSHOT
After Visit Summary   11/26/2018    Noemí Syed    MRN: [id number]           Patient Information     Date Of Birth          1945        Visit Information        Provider Department      11/26/2018 10:00 AM Wegener, Joel Daniel Irwin, MD [facility]        Today's Diagnoses     Medicare annual wellness visit, subsequent    -  1    Hyperlipidemia LDL goal <100        Palpitations        Morbid obesity (H)        SVT (supraventricular tachycardia) (H)        Screening for diabetes mellitus        Medication management        Elevated fasting blood sugar          Care Instructions    ASSESSMENT AND PLAN  1. Medicare annual wellness visit, subsequent  Had flu shot already.  Up to date on colonoscopy.  Plans to do mammogram.  Gave advanced directive to review/return.     2. Hyperlipidemia LDL goal <100    - simvastatin (ZOCOR) 40 MG tablet; TAKE 1 TABLET (40 MG) BY MOUTH DAILY  Dispense: 90 tablet; Refill: 3  - Lipid panel reflex to direct LDL Fasting  - Comprehensive metabolic panel    3. Palpitations    - metoprolol tartrate (LOPRESSOR) 25 MG tablet; TAKE 1 TABLET (25 MG) BY MOUTH DAILY  Dispense: 90 tablet; Refill: 3  - Albumin Random Urine Quantitative with Creat Ratio  - Comprehensive metabolic panel    4. Morbid obesity (H)      5. SVT (supraventricular tachycardia) (H)    - metoprolol tartrate (LOPRESSOR) 25 MG tablet; TAKE 1 TABLET (25 MG) BY MOUTH DAILY  Dispense: 90 tablet; Refill: 3  - Albumin Random Urine Quantitative with Creat Ratio  - Comprehensive metabolic panel    6. Screening for diabetes mellitus  Desires a1c specifically.  May or may not be covered.     - Hemoglobin A1c    7. Medication management    - Albumin Random Urine Quantitative with Creat Ratio  - Comprehensive metabolic panel    8. Elevated fasting blood sugar    - Hemoglobin A1c    Follow up one year.     MYCHART FOR ON-LINE CARE(VISITS), LABS, REFILLS, MESSAGING, ETC [email or web address] ,  "[phone]    E-VISIT: click \"on-line care, then request e-visit\".  E-visits work well for following up on issues we have discussed in clinic previously which may need new prescriptions, new prescriptions or substantial discussion. These are always done by me (Dr. Wegener).     ONCARE VISIT:  [email or web address]  - we treat nearly 50 common conditions through on-care.  These are done in an hour by on-call staff.     RADIOLOGY:  [facility]:  [phone]   [facility]: [phone]    Mammogram and Colonoscopy [phone]    Smoking Cessation: [email or web address], 4-438-995-PLAN (0943)      CONSUMER PRICE LINE for estimates of test costs:  [phone]       Preventive Health Recommendations    See your health care provider every year to    Review health changes.     Discuss preventive care.      Review your medicines if your doctor has prescribed any.      You no longer need a yearly Pap test unless you've had an abnormal Pap test in the past 10 years. If you have vaginal symptoms, such as bleeding or discharge, be sure to talk with your provider about a Pap test.      Every 1 to 2 years, have a mammogram.  If you are over 69, talk with your health care provider about whether or not you want to continue having screening mammograms.      Every 10 years, have a colonoscopy. Or, have a yearly FIT test (stool test). These exams will check for colon cancer.       Have a cholesterol test every 5 years, or more often if your doctor advises it.       Have a diabetes test (fasting glucose) every three years. If you are at risk for diabetes, you should have this test more often.       At age 65, have a bone density scan (DEXA) to check for osteoporosis (brittle bone disease).    Shots:    Get a flu shot each year.    Get a tetanus shot every 10 years.    Talk to your doctor about your pneumonia vaccines. There are now two you should receive - Pneumovax (PPSV 23) and Prevnar (PCV 13).    Talk to " your pharmacist about the shingles vaccine.    Talk to your doctor about the hepatitis B vaccine.    Nutrition:     Eat at least 5 servings of fruits and vegetables each day.      Eat whole-grain bread, whole-wheat pasta and brown rice instead of white grains and rice.      Get adequate Calcium and Vitamin D.     Lifestyle    Exercise at least 150 minutes a week (30 minutes a day, 5 days a week). This will help you control your weight and prevent disease.      Limit alcohol to one drink per day.      No smoking.       Wear sunscreen to prevent skin cancer.       See your dentist twice a year for an exam and cleaning.      See your eye doctor every 1 to 2 years to screen for conditions such as glaucoma, macular degeneration and cataracts.    Personalized Prevention Plan  You are due for the preventive services outlined below.  Your care team is available to assist you in scheduling these services.  If you have already completed any of these items, please share that information with your care team to update in your medical record.  Health Maintenance Due   Topic Date Due     Microalbumin Lab - yearly  08/08/2012     Discuss Advance Directive Planning  10/01/2017     Mammogram - every 2 years  07/07/2018     Flu Vaccine (1) 09/01/2018     Basic Metabolic Lab - yearly  09/29/2018     Medicare Annual Wellness Visit  09/29/2018     FALL RISK ASSESSMENT  09/29/2018     Cholesterol Lab - yearly  09/29/2018     Depression Assessment 2 - yearly  09/29/2018             Follow-ups after your visit        Who to contact     If you have questions or need follow up information about today's clinic visit or your schedule please contact [facility] directly at [phone].  Normal or non-critical lab and imaging results will be communicated to you by MyChart, letter or phone within 4 business days after the clinic has received the results. If you do not hear from us within 7 days, please contact the clinic through  "MyChart or phone. If you have a critical or abnormal lab result, we will notify you by phone as soon as possible.  Submit refill requests through [employer] or call your pharmacy and they will forward the refill request to us. Please allow 3 business days for your refill to be completed.          Additional Information About Your Visit        Care EveryWhere ID     This is your Care EveryWhere ID. This could be used by other organizations to access your [place] medical records  [id number]        Your Vitals Were     Pulse Temperature Respirations Height Pulse Oximetry BMI (Body Mass Index)    61 98.3  F (36.8  C) (Oral) 14 5' 3.25\" (1.607 m) 94% 40.6 kg/m2       Blood Pressure from Last 3 Encounters:   11/26/18 108/71   09/29/17 115/66   09/20/16 116/76    Weight from Last 3 Encounters:   11/26/18 231 lb (104.8 kg)   09/29/17 229 lb 8 oz (104.1 kg)   09/20/16 225 lb (102.1 kg)              We Performed the Following     Albumin Random Urine Quantitative with Creat Ratio     Comprehensive metabolic panel     Hemoglobin A1c     Lipid panel reflex to direct LDL Fasting          Where to get your medicines      These medications were sent to [facility]/pharmacy #[id number] - [place], [place] - [address] [facility]  [address], [place] [address]     Phone:  [phone]     metoprolol tartrate 25 MG tablet    simvastatin 40 MG tablet          Primary Care Provider Office Phone # Fax #    Joel Daniel Irwin Wegener, [phone] [address] [place] [address]        Equal Access to Services     [facility] CampusCHRISTINA : Hadii aad ku hadasho Soomaali, waaxda luqadaha, qaybta kaalmada adeegvolodymyr, joan prasad . So [facility] [phone].    ATENCIÓN: Si habla español, tiene a sepulveda disposición servicios gratuitos de asistencia lingüística. Llame al [phone].    We comply with applicable federal civil rights laws and [place] laws. We do not discriminate on the basis of race, color, national " origin, age, disability, sex, sexual orientation, or gender identity.            Thank you!     Thank you for choosing [facility]  for your care. Our goal is always to provide you with excellent care. Hearing back from our patients is one way we can continue to improve our services. Please take a few minutes to complete the written survey that you may receive in the mail after your visit with us. Thank you!             Your Updated Medication List - Protect others around you: Learn how to safely use, store and throw away your medicines at www.disposemymeds.org.          This list is accurate as of 11/26/18 10:35 AM.  Always use your most recent med list.                   Brand Name Dispense Instructions for use Diagnosis    aspirin 325 MG EC tablet    ASA    100 Tab    ONE DAILY (as Excedrin daily or baby aspirin)    Hyperlipemia, mixed       DAILY MULTIVITAMIN PO      Take 1 tablet by mouth daily        Fish Oil 500 MG Caps     120    2 tablets twice daily    Other and unspecified hyperlipidemia       GLUCOSAMINE-CHONDROITIN PO      Take 1 tablet by mouth daily        METAMUCIL 0.52 g capsule   Generic drug:  psyllium     0    3 capsules  in the evening and 2 tablets in the am    Slow transit constipation, External hemorrhoids without mention of complication       metoprolol tartrate 25 MG tablet    LOPRESSOR    90 tablet    TAKE 1 TABLET (25 MG) BY MOUTH DAILY    Palpitations, SVT (supraventricular tachycardia) (H)       simvastatin 40 MG tablet    ZOCOR    90 tablet    TAKE 1 TABLET (40 MG) BY MOUTH DAILY    Hyperlipidemia LDL goal <100       VITAMIN B-12 PO      1 tab daily

## 2018-11-26 NOTE — PATIENT INSTRUCTIONS
"ASSESSMENT AND PLAN  1. Medicare annual wellness visit, subsequent  Had flu shot already.  Up to date on colonoscopy.  Plans to do mammogram.  Gave advanced directive to review/return.     2. Hyperlipidemia LDL goal <100    - simvastatin (ZOCOR) 40 MG tablet; TAKE 1 TABLET (40 MG) BY MOUTH DAILY  Dispense: 90 tablet; Refill: 3  - Lipid panel reflex to direct LDL Fasting  - Comprehensive metabolic panel    3. Palpitations    - metoprolol tartrate (LOPRESSOR) 25 MG tablet; TAKE 1 TABLET (25 MG) BY MOUTH DAILY  Dispense: 90 tablet; Refill: 3  - Albumin Random Urine Quantitative with Creat Ratio  - Comprehensive metabolic panel    4. Morbid obesity (H)      5. SVT (supraventricular tachycardia) (H)    - metoprolol tartrate (LOPRESSOR) 25 MG tablet; TAKE 1 TABLET (25 MG) BY MOUTH DAILY  Dispense: 90 tablet; Refill: 3  - Albumin Random Urine Quantitative with Creat Ratio  - Comprehensive metabolic panel    6. Screening for diabetes mellitus  Desires a1c specifically.  May or may not be covered.     - Hemoglobin A1c    7. Medication management    - Albumin Random Urine Quantitative with Creat Ratio  - Comprehensive metabolic panel    8. Elevated fasting blood sugar    - Hemoglobin A1c    Follow up one year.     MYCHART FOR ON-LINE CARE(VISITS), LABS, REFILLS, MESSAGING, ETC [email or web address] , [phone]    E-VISIT: click \"on-line care, then request e-visit\".  E-visits work well for following up on issues we have discussed in clinic previously which may need new prescriptions, new prescriptions or substantial discussion. These are always done by me (Dr. Wegener).     ONCARE VISIT:  [email or web address]  - we treat nearly 50 common conditions through on-care.  These are done in an hour by on-call staff.     RADIOLOGY:  [facility]:  [phone]   [facility]: [phone]    Mammogram and Colonoscopy [phone]    Smoking Cessation: www.quitplan.org, 7-381-925-PLAN " (3240)      CONSUMER PRICE LINE for estimates of test costs:  [phone]       Preventive Health Recommendations    See your health care provider every year to    Review health changes.     Discuss preventive care.      Review your medicines if your doctor has prescribed any.      You no longer need a yearly Pap test unless you've had an abnormal Pap test in the past 10 years. If you have vaginal symptoms, such as bleeding or discharge, be sure to talk with your provider about a Pap test.      Every 1 to 2 years, have a mammogram.  If you are over 69, talk with your health care provider about whether or not you want to continue having screening mammograms.      Every 10 years, have a colonoscopy. Or, have a yearly FIT test (stool test). These exams will check for colon cancer.       Have a cholesterol test every 5 years, or more often if your doctor advises it.       Have a diabetes test (fasting glucose) every three years. If you are at risk for diabetes, you should have this test more often.       At age 65, have a bone density scan (DEXA) to check for osteoporosis (brittle bone disease).    Shots:    Get a flu shot each year.    Get a tetanus shot every 10 years.    Talk to your doctor about your pneumonia vaccines. There are now two you should receive - Pneumovax (PPSV 23) and Prevnar (PCV 13).    Talk to your pharmacist about the shingles vaccine.    Talk to your doctor about the hepatitis B vaccine.    Nutrition:     Eat at least 5 servings of fruits and vegetables each day.      Eat whole-grain bread, whole-wheat pasta and brown rice instead of white grains and rice.      Get adequate Calcium and Vitamin D.     Lifestyle    Exercise at least 150 minutes a week (30 minutes a day, 5 days a week). This will help you control your weight and prevent disease.      Limit alcohol to one drink per day.      No smoking.       Wear sunscreen to prevent skin cancer.       See your dentist twice a year for an exam and  cleaning.      See your eye doctor every 1 to 2 years to screen for conditions such as glaucoma, macular degeneration and cataracts.    Personalized Prevention Plan  You are due for the preventive services outlined below.  Your care team is available to assist you in scheduling these services.  If you have already completed any of these items, please share that information with your care team to update in your medical record.  Health Maintenance Due   Topic Date Due     Microalbumin Lab - yearly  08/08/2012     Discuss Advance Directive Planning  10/01/2017     Mammogram - every 2 years  07/07/2018     Flu Vaccine (1) 09/01/2018     Basic Metabolic Lab - yearly  09/29/2018     Medicare Annual Wellness Visit  09/29/2018     FALL RISK ASSESSMENT  09/29/2018     Cholesterol Lab - yearly  09/29/2018     Depression Assessment 2 - yearly  09/29/2018

## 2018-11-26 NOTE — PROGRESS NOTES
"  SUBJECTIVE:   Noemí Syed is a 73 year old female who presents for Preventive Visit.    Are you in the first 12 months of your Medicare Part B coverage?  No    Physical Health:    In general, how would you rate your overall physical health? good    Outside of work, how many days during the week do you exercise? 1 day/week    Outside of work, approximately how many minutes a day do you exercise?45-60 minutes    If you drink alcohol do you typically have >3 drinks per day or >7 drinks per week? No    Do you usually eat at least 4 servings of fruit and vegetables a day, include whole grains & fiber and avoid regularly eating high fat or \"junk\" foods? Yes    Do you have any problems taking medications regularly?  No    Do you have any side effects from medications? none    Needs assistance for the following daily activities: no assistance needed    Which of the following safety concerns are present in your home?:  none identified     Hearing impairment: Yes, patient has hearing aids    In the past 6 months, have you been bothered by leaking of urine? no    Mental Health:    In general, how would you rate your overall mental or emotional health? good  PHQ-2 Score:      Additional concerns to address?  Wants to get A1C check     Fall risk:      Fallen 2 or more times in the past year?: No  Any fall with injury in the past year?: No      COGNITIVE SCREEN  1) Repeat 3 items (Leader, Season, Table)    2) Clock draw: NORMAL  3) 3 item recall: Recalls 3 objects  Results: 3 items recalled: COGNITIVE IMPAIRMENT LESS LIKELY    Mini-CogTM Copyright [employer]. Licensed by the author for use in [facility]; reprinted with permission ([email or web address]@.[email or web address]). All rights reserved.            Reviewed and updated as needed this visit by clinical staff  Tobacco  Allergies  Meds  Med Hx  Surg Hx  Fam Hx  Soc Hx        Reviewed and updated as needed this visit by Provider        Social History   Substance Use Topics     " "Smoking status: Never Smoker     Smokeless tobacco: Never Used     Alcohol use Yes      Comment: 1 or twice a month                             Do you feel safe in your environment - Yes    Do you have a Health Care Directive?: No: Advance care planning reviewed with patient; information given to patient to review.    Current providers sharing in care for this patient include:   Patient Care Team:  Wegener, Joel Daniel Irwin, MD as PCP - General (Family Practice)  Glynn Martinez MD as MD (Family Practice)  Jac Verdin MD as MD (Colon and Rectal Surgery)    The following health maintenance items are reviewed in Epic and correct as of today:  Health Maintenance   Topic Date Due     MICROALBUMIN Q1 YEAR  08/08/2012     ADVANCE DIRECTIVE PLANNING Q5 YRS  10/01/2017     MAMMO Q2 YR  07/07/2018     BMP Q1 YR  09/29/2018     MEDICARE ANNUAL WELLNESS VISIT  09/29/2018     FALL RISK ASSESSMENT  09/29/2018     LIPID MONITORING Q1 YEAR  09/29/2018     PHQ-2 Q1 YR  11/26/2019     DEXA Q3 YR  10/04/2020     TETANUS Q10 YR  08/29/2022     COLONOSCOPY Q10 YR  01/15/2025     PNEUMOCOCCAL  Completed     INFLUENZA VACCINE  Completed     Labs reviewed in EPIC        ROS:  Constitutional, HEENT, cardiovascular, pulmonary, GI, , musculoskeletal, neuro, skin, endocrine and psych systems are negative, except as otherwise noted.    OBJECTIVE:   /71 (BP Location: Left arm, Patient Position: Sitting, Cuff Size: Adult Large)  Pulse 61  Temp 98.3  F (36.8  C) (Oral)  Resp 14  Ht 5' 3.25\" (1.607 m)  Wt 231 lb (104.8 kg)  SpO2 94%  BMI 40.6 kg/m2 Estimated body mass index is 40.6 kg/(m^2) as calculated from the following:    Height as of this encounter: 5' 3.25\" (1.607 m).    Weight as of this encounter: 231 lb (104.8 kg).  EXAM:   GENERAL: healthy, alert and no distress  EYES: Eyes grossly normal to inspection, PERRL and conjunctivae and sclerae normal  HENT: ear canals and TM's normal, nose and mouth without ulcers or " lesions  NECK: no adenopathy, no asymmetry, masses, or scars and thyroid normal to palpation  RESP: lungs clear to auscultation - no rales, rhonchi or wheezes  BREAST: normal without masses, tenderness or nipple discharge and no palpable axillary masses or adenopathy  CV: regular rate and rhythm, normal S1 S2, no S3 or S4, no murmur, click or rub, no peripheral edema and peripheral pulses strong  ABDOMEN: soft, nontender, no hepatosplenomegaly, no masses and bowel sounds normal  MS: no gross musculoskeletal defects noted, no edema  SKIN: no suspicious lesions or rashes  NEURO: Normal strength and tone, mentation intact and speech normal  PSYCH: mentation appears normal, affect normal/bright        ASSESSMENT / PLAN:   ASSESSMENT AND PLAN  1. Medicare annual wellness visit, subsequent  Had flu shot already.  Up to date on colonoscopy.  Plans to do mammogram.  Gave advanced directive to review/return.     2. Hyperlipidemia LDL goal <100    - simvastatin (ZOCOR) 40 MG tablet; TAKE 1 TABLET (40 MG) BY MOUTH DAILY  Dispense: 90 tablet; Refill: 3  - Lipid panel reflex to direct LDL Fasting  - Comprehensive metabolic panel    3. Palpitations    - metoprolol tartrate (LOPRESSOR) 25 MG tablet; TAKE 1 TABLET (25 MG) BY MOUTH DAILY  Dispense: 90 tablet; Refill: 3  - Albumin Random Urine Quantitative with Creat Ratio  - Comprehensive metabolic panel    4. Morbid obesity (H)      5. SVT (supraventricular tachycardia) (H)    - metoprolol tartrate (LOPRESSOR) 25 MG tablet; TAKE 1 TABLET (25 MG) BY MOUTH DAILY  Dispense: 90 tablet; Refill: 3  - Albumin Random Urine Quantitative with Creat Ratio  - Comprehensive metabolic panel    6. Screening for diabetes mellitus  Desires a1c specifically.  May or may not be covered.     - Hemoglobin A1c    7. Medication management    - Albumin Random Urine Quantitative with Creat Ratio  - Comprehensive metabolic panel    8. Elevated fasting blood sugar    - Hemoglobin A1c    Follow up one year.  "    End of Life Planning:  Patient currently has an advanced directive: No.  I have verified the patient's ablity to prepare an advanced directive/make health care decisions.  Literature was provided to assist patient in preparing an advanced directive.    COUNSELING:  Reviewed preventive health counseling, as reflected in patient instructions       Regular exercise       Healthy diet/nutrition       Colon cancer screening    BP Readings from Last 1 Encounters:   11/26/18 108/71     Estimated body mass index is 40.6 kg/(m^2) as calculated from the following:    Height as of this encounter: 5' 3.25\" (1.607 m).    Weight as of this encounter: 231 lb (104.8 kg).      Weight management plan: Discussed healthy diet and exercise guidelines and patient will follow up in 12 months in clinic to re-evaluate.     reports that she has never smoked. She has never used smokeless tobacco.      Appropriate preventive services were discussed with this patient, including applicable screening as appropriate for cardiovascular disease, diabetes, osteopenia/osteoporosis, and glaucoma.  As appropriate for age/gender, discussed screening for colorectal cancer, prostate cancer, breast cancer, and cervical cancer. Checklist reviewing preventive services available has been given to the patient.    Reviewed patients plan of care and provided an AVS. The Basic Care Plan (routine screening as documented in Health Maintenance) for Noemí meets the Care Plan requirement. This Care Plan has been established and reviewed with the Patient.    Counseling Resources:  ATP IV Guidelines  Pooled Cohorts Equation Calculator  Breast Cancer Risk Calculator  FRAX Risk Assessment  ICSI Preventive Guidelines  Dietary Guidelines for Americans, 2010  USDA's MyPlate  ASA Prophylaxis  Lung CA Screening    Joel Daniel Wegener, MD  [facility]  "

## 2018-11-27 LAB
ALBUMIN SERPL-MCNC: 3.8 G/DL (ref 3.4–5)
ALP SERPL-CCNC: 58 U/L (ref 40–150)
ALT SERPL W P-5'-P-CCNC: 21 U/L (ref 0–50)
ANION GAP SERPL CALCULATED.3IONS-SCNC: 7 MMOL/L (ref 3–14)
AST SERPL W P-5'-P-CCNC: 15 U/L (ref 0–45)
BILIRUB SERPL-MCNC: 0.6 MG/DL (ref 0.2–1.3)
BUN SERPL-MCNC: 24 MG/DL (ref 7–30)
CALCIUM SERPL-MCNC: 9 MG/DL (ref 8.5–10.1)
CHLORIDE SERPL-SCNC: 107 MMOL/L (ref 94–109)
CHOLEST SERPL-MCNC: 165 MG/DL
CO2 SERPL-SCNC: 27 MMOL/L (ref 20–32)
CREAT SERPL-MCNC: 0.68 MG/DL (ref 0.52–1.04)
CREAT UR-MCNC: 105 MG/DL
GFR SERPL CREATININE-BSD FRML MDRD: 84 ML/MIN/1.7M2
GLUCOSE SERPL-MCNC: 103 MG/DL (ref 70–99)
HDLC SERPL-MCNC: 54 MG/DL
LDLC SERPL CALC-MCNC: 87 MG/DL
MICROALBUMIN UR-MCNC: 18 MG/L
MICROALBUMIN/CREAT UR: 16.86 MG/G CR (ref 0–25)
NONHDLC SERPL-MCNC: 111 MG/DL
POTASSIUM SERPL-SCNC: 4.1 MMOL/L (ref 3.4–5.3)
PROT SERPL-MCNC: 7.2 G/DL (ref 6.8–8.8)
SODIUM SERPL-SCNC: 141 MMOL/L (ref 133–144)
TRIGL SERPL-MCNC: 119 MG/DL

## 2018-11-27 RX ORDER — SIMVASTATIN 40 MG
TABLET ORAL
Qty: 90 TABLET | Refills: 0 | OUTPATIENT
Start: 2018-11-27

## 2018-11-27 NOTE — TELEPHONE ENCOUNTER
"Requested Prescriptions   Pending Prescriptions Disp Refills     simvastatin (ZOCOR) 40 MG tablet [Pharmacy Med Name: SIMVASTATIN 40 MG TABLET] 30 tablet 0     Sig: TAKE 1 TABLET BY MOUTH EVERY DAY    Statins Protocol Passed    11/27/2018  3:00 PM       Passed - LDL on file in past 12 months    Recent Labs   Lab Test  11/26/18   1111   LDL  87            Passed - No abnormal creatine kinase in past 12 months    No lab results found.            Passed - Recent (12 mo) or future (30 days) visit within the authorizing provider's specialty    Patient had office visit in the last 12 months or has a visit in the next 30 days with authorizing provider or within the authorizing provider's specialty.  See \"Patient Info\" tab in inbasket, or \"Choose Columns\" in Meds & Orders section of the refill encounter.             Passed - Patient is age 18 or older       Passed - No active pregnancy on record       Passed - No positive pregnancy test in past 12 months            "

## 2019-05-31 ENCOUNTER — OFFICE VISIT (OUTPATIENT)
Dept: ORTHOPEDICS | Facility: CLINIC | Age: 74
End: 2019-05-31
Payer: MEDICARE

## 2019-05-31 VITALS
HEIGHT: 65 IN | DIASTOLIC BLOOD PRESSURE: 80 MMHG | SYSTOLIC BLOOD PRESSURE: 122 MMHG | WEIGHT: 228.3 LBS | BODY MASS INDEX: 38.04 KG/M2

## 2019-05-31 DIAGNOSIS — S93.491A SPRAIN OF ANTERIOR TALOFIBULAR LIGAMENT OF RIGHT ANKLE, INITIAL ENCOUNTER: Primary | ICD-10-CM

## 2019-05-31 ASSESSMENT — MIFFLIN-ST. JEOR: SCORE: 1531.44

## 2019-05-31 NOTE — PATIENT INSTRUCTIONS
WHAT IS AN ANKLE SPRAIN:  Symptoms include pain, bruising, and swelling around ankle, often on outer side. The pain may be sharp with activity and dull at rest. You may be walking with a limp at first. The swelling is often present after the pain resolves. If your pain is severe, not improving over 5-7 days, or shoots up your leg, let your physician know as you may need crutches and an immobilizer.    Treatment:  -Ice 10-15 minutes several times a day for the first few days and then after activity.  -Walk as tolerated. Restrict other activities until pain allows. Biking, pool running or swimming are good alternative activities.  -You may benefit from an ankle brace for support while strengthening with therapy  -Some require immobilzation and crutches initially    Strengthening therapy  -Ice 10-15 minutes after activity. (or Ice bath 5-7min)  -often hip and ankle weakness leads to lower extremity (foot, ankle, shin, and knee) problems so a lot of focus will be on core strength and balance  - recommend yoga for core strengthening and stretching  -Perform exercises as instructed through handout or formal therapy if doing. Until then start with the following:  -ankle strengthening (additional below)   1) balance on one foot 1-2 min daily (perform on both feet)   2) arch raises- tighten bottom of foot like trying to shorten foot and hold x 3-5  sec, repeat 5 times   3) ankle exercises (4 way with theraband)- 3 sets of 10-15 (fatigue) daily   5) heel raises on step-- once painfree lowering on both, start to slowly lower on  one foot    Return to activity guidelines:  -If it hurts, do not do it!  -wear ankle brace until pain free with full activity and exercises for at least 6 weeks  -Must meet each goal before return to play:   1) painfree jogging straight line   2) pain free sprints   3) pain free cutting/ changing directions      Ankle Sprain Exercises    As soon as it doesn t hurt too much to put pressure on the ball  of your foot, start stretching your ankle using the towel stretch. When this stretch is easy, try the other exercises.    Towel stretch: Sit on a hard surface with your injured leg stretched out in front of you. Loop a towel around your toes and the ball of your foot and pull the towel toward your body keeping your leg straight. Hold this position for 15 to 30 seconds and then relax. Repeat 3 times.    Standing calf stretch: Stand facing a wall with your hands on the wall at about eye level. Keep your injured leg back with your heel on the floor. Keep the other leg forward with the knee bent. Turn your back foot slightly inward (as if you were pigeon-toed). Slowly lean into the wall until you feel a stretch in the back of your calf. Hold the stretch for 15 to 30 seconds. Return to the starting position. Repeat 3 times. Do this exercise several times each day.    Standing soleus stretch: Stand facing a wall with your hands on the wall at about chest height. Keep your injured leg back with your heel on the floor. Keep the other leg forward with the knee bent. Turn your back foot slightly inward (as if you were pigeon-toed). Bend your back knee slightly and gently lean into the wall until you feel a stretch in the lower calf of your injured leg. Hold the stretch for 15 to 30 seconds. Return to the starting position. Repeat 3 times.    Ankle range of motion: Sit or lie down with your legs straight and your knees pointing toward the ceiling. Point your toes on your injured side toward your nose, then away from your body. Point your toes in toward your other foot and then out away from your other foot. Finally, move the top of your foot in circles. Move only your foot and ankle. Don't move your leg. Repeat 10 times in each direction. Push hard in all directions.  Resisted ankle dorsiflexion: Tie a knot in one end of the elastic tubing and shut the knot in a door. Tie a loop in the other end of the tubing and put the foot  on your injured side through the loop so that the tubing goes around the top of the foot. Sit facing the door with your injured leg straight out in front of you. Move away from the door until there is tension in the tubing. Keeping your leg straight, pull the top of your foot toward your body, stretching the tubing. Slowly return to the starting position. Do 2 sets of 15.  Resisted ankle plantar flexion: Sit with your injured leg stretched out in front of you. Loop the tubing around the ball of your foot. Hold the ends of the tubing with both hands. Gently press the ball of your foot down and point your toes, stretching the tubing. Return to the starting position. Do 2 sets of 15.    Resisted ankle inversion: Sit with your legs stretched out in front of you. Cross the ankle of your uninjured leg over your other ankle. Wrap elastic tubing around the ball of the foot of your injured leg and then loop it around your other foot so that the tubing is anchored there at one end. Hold the other end of the tubing in your hand. Turn the foot of your injured leg inward and upward. This will stretch the tubing. Return to the starting position. Do 2 sets of 15.    Resisted ankle eversion: Sit with both legs stretched out in front of you, with your feet about a shoulder's width apart. Tie a loop in one end of elastic tubing. Put the foot of your injured leg through the loop so that the tubing goes around the arch of that foot and wraps around the outside of the other foot. Hold onto the other end of the tubing with your hand to provide tension. Turn the foot of your injured leg up and out. Make sure you keep your other foot still so that it will allow the tubing to stretch as you move the foot of your injured leg. Return to the starting position. Do 2 sets of 15.  You may do the following exercises when you can stand on your injured ankle without pain.    Heel raise: Stand behind a chair or counter with both feet flat on the  floor. Using the chair or counter as a support, rise up onto your toes and hold for 5 seconds. Then slowly lower yourself down without holding onto the support. (It's OK to keep holding onto the support if you need to.) When this exercise becomes less painful, try doing this exercise while you are standing on the injured leg only. Repeat 15 times. Do 2 sets of 15. Rest 30 seconds between sets.    Step-up: Stand with the foot of your injured leg on a support 3 to 5 inches (8 to 13 centimeters) high --like a small step or block of wood. Keep your other foot flat on the floor. Shift your weight onto the injured leg on the support. Straighten your injured leg as the other leg comes off the floor. Return to the starting position by bending your injured leg and slowly lowering your uninjured leg back to the floor. Do 2 sets of 15.    Balance and reach exercises: Stand next to a chair with your injured leg farther from the chair. The chair will provide support if you need it. Stand on the foot of your injured leg and bend your knee slightly. Try to raise the arch of this foot while keeping your big toe on the floor. Keep your foot in this position.    With the hand that is farther away from the chair, reach forward in front of you by bending at the waist. Avoid bending your knee any more as you do this. Repeat this 15 times. To make the exercise more challenging, reach farther in front of you. Do 2 sets of 15.    While keeping your arch raised, reach the hand that is farther away from the chair across your body toward the chair. The farther you reach, the more challenging the exercise. Do 2 sets of 15.    Side-lying leg lift: Lie on your uninjured side. Tighten the front thigh muscles on your injured leg and lift that leg 8 to 10 inches (20 to 25 centimeters) away from the other leg. Keep the leg straight and lower it slowly. Do 2 sets of 15.  If you have access to a wobble board, do the following exercises:    Wobble  board exercises  Stand on a wobble board with your feet shoulder-width apart.    Rock the board forwards and backwards 30 times, then side to side 30 times. Hold on to a chair if you need support.  Rotate the wobble board around so that the edge of the board is in contact with the floor at all times. Do this 30 times in a clockwise and then a counterclockwise direction.  Balance on the wobble board for as long as you can without letting the edges touch the floor. Try to do this for 2 minutes without touching the floor.  Rotate the wobble board in clockwise and counterclockwise circles, but do not let the edge of the board touch the floor.  When you have mastered the wobble exercises standing on both legs, try repeating them while standing on just your injured leg. After you are able to do these exercises on one leg, try to do them with your eyes closed. Make sure you have something nearby to support you in case you lose your balance.    Developed by [employer]  Published by [employer]  Copyright  2014 [employer] and/or one of its subsidiaries. All rights reserved.

## 2019-05-31 NOTE — LETTER
5/31/2019       RE: Noemí Syed  [address] [id number]     Dear Colleague,    Thank you for referring your patient, Noemí Syed, to the [facility] SPORTS AND ORTHOPAEDIC WALK IN CLINIC at [facility]. Please see a copy of my visit note below.          SPORTS & ORTHOPEDIC WALK-IN VISIT 5/31/2019    Primary Care Physician: Dr. Wegener    Tripped going downstairs in her apartment last night. Did not hear pop or snap. Went to party and came home but there was more pain after party coming home. Some bruising on foot. Iced and took Asprin last night but nothing this am.      Reason for visit:     What part of your body is injured / painful?  right foot and ankle     What caused the injury /pain? Tripped going down the stairs     How long ago did your injury occur or pain begin? Yesterday 5/30/19    What are your most bothersome symptoms? Pain and Swelling    How would you characterize your symptom?  aching and dull    What makes your symptoms better? Ice and Ibuprofen    What makes your symptoms worse? Walking and dorsi flexion     Have you been previously seen for this problem? No    Medical History:    Any recent changes to your medical history? No    Any new medication prescribed since last visit? No    Have you had surgery on this body part before? No    Social History:    Occupation: retired     Handedness: Left    Exercise: Works out with  once a week     Review of Systems:    Do you have fever, chills, weight loss? No    Do you have any vision problems? No    Do you have any chest pain or edema? No    Do you have any shortness of breath or wheezing?  No    Do you have stomach problems? No    Do you have any numbness or focal weakness? No    Do you have diabetes? No    Do you have problems with bleeding or clotting? No    Do you have an rashes or other skin lesions? No       CHIEF COMPLAINT:  Pain of the Right Foot and Pain of the Right Ankle      HISTORY OF PRESENT ILLNESS  Ms. Syed is a pleasant 74 year old year old female who presents to clinic today with acute right ankle and foot pain.  Tripped going downstairs in her apartment last night. Did not hear pop or snap. Went to a party and came home but there was more pain after party coming home. Some bruising on foot. Iced and took Asprin last night but nothing this am.  She states that pain remains mild but persistent with ambulation.  Swelling persists this am and bruising has formed adjacent ankle.  Improved with rest.     Additional history: as documented    MEDICAL HISTORY  Patient Active Problem List   Diagnosis     Obesity     Slow transit constipation     Nonsenile cataract     Family history of diabetes mellitus     Family history of stroke (cerebrovascular)     Family history of other cardiovascular diseases     Mitral valve disorder     CARDIOMEGALY on CXR     Hemorrhoids     Papanicolaou smear of cervix with atypical squamous cells of undetermined significance (ASC-US)     Hearing loss     Intramural leiomyoma of uterus     Abnormal glucose     Disease of lung     Coronary atherosclerosis     HYPERLIPIDEMIA LDL GOAL <100     Osteopenia     Palpitations     Dependent edema     Morbid obesity (H)     Morbid obesity, unspecified obesity type (H)     SVT (supraventricular tachycardia) (H)       Current Outpatient Medications   Medication Sig Dispense Refill     ASPIRIN 325 MG PO TBEC ONE DAILY (as Excedrin daily or baby aspirin) 100 Tab 3     FISH  MG OR CAPS 2 tablets twice daily  120 3     GLUCOSAMINE-CHONDROITIN PO Take 1 tablet by mouth daily       METAMUCIL 0.52 GM OR CAPS 3 capsules  in the evening and 2 tablets in the am  0 0     metoprolol tartrate (LOPRESSOR) 25 MG tablet TAKE 1 TABLET (25 MG) BY MOUTH DAILY 90 tablet 3     Multiple Vitamin (DAILY MULTIVITAMIN PO) Take 1 tablet by mouth daily       simvastatin (ZOCOR) 40 MG tablet TAKE 1 TABLET (40 MG) BY MOUTH DAILY 90  "tablet 3     VITAMIN B-12 PO 1 tab daily         Allergies   Allergen Reactions     Codeine Nausea     Zithromax [Azithromycin Dihydrate] Other (See Comments)     Pt declines to take given drugs hx of possible side effects        Family History   Problem Relation Age of Onset     Cardiovascular Mother         pace maker dx 85     Eye Disorder Mother         cataracts dx 85     Cerebrovascular Disease Father         dx 83     Diabetes Sister         type 2 dx 42     Depression Sister         dx 30     Gastrointestinal Disease Brother         IBS dx age ?     Lipids Sister         dx 40     Family History Negative Paternal Grandfather         fell off hay wagon -  in his 80s think MI     Family History Negative Paternal Grandmother          in her 80s      Family History Negative Maternal Grandfather          in his 80s - probably cardiovascular       Additional medical/Social/S[facility] and updated as appropriate.     REVIEW OF SYSTEMS (2019)  A 10-point review of systems was obtained and is negative except for as noted in the HPI.      PHYSICAL EXAM  /80   Ht 1.651 m (5' 5\")   Wt 103.6 kg (228 lb 4.8 oz)   BMI 37.99 kg/m       General  - normal appearance, in no obvious distress  CV  - normal pulses at posterior tib and dorsalis pedis  Pulm  - normal respiratory pattern, non-labored  Musculoskeletal - left ankle  - stance: Mildly antalgic gait   - inspection: moderate focal swelling laterally at ATFL only, normal bone and joint alignment, no obvious deformity  - palpation: tenderness over ATFL, no tenderness over lateral or medial malleoli, navicular, or base of 5th MT  - ROM: intact globally but limited in eversion and dorsiflexion secondary to pain  - strength: 4/5 in eversion, 5/5 in all other planes  - special tests:  pain with inversion stress  (-) anterior drawer  (-) talar tilt  (-) Tinel's  (-) squeeze test  Neuro  - no sensory or motor deficit, grossly " normal coordination, normal muscle tone  Skin  - ecchymosis and swelling overlying lateral foot-ankle junction, no warmth or induration, no obvious rash  Psych  - interactive, appropriate, normal mood and affect     ASSESSMENT & PLAN  Ms. Syed is a 74 year old year old female who presents to clinic today with acute right ankle pain after an inversion injury yesterday 5/30/19.    Diagnosis: Sprain of ATFL of left ankle.     -Ice, elevation discussed  -Lace-up ankle brace provided  -Ankle ROM exercises demonstrated  -Ibuprofen over the next 3-5 days  -Follow up 2 weeks; reassess progress and escalate rehab if doing well    It was a pleasure seeing Noemí today.    Manpreet Shannon DO, CAQSM  Primary Care Sports Medicine

## 2019-05-31 NOTE — PROGRESS NOTES
SPORTS & ORTHOPEDIC WALK-IN VISIT 5/31/2019    Primary Care Physician: Dr. Wegener    Tripped going downstairs in her apartment last night. Did not hear pop or snap. Went to party and came home but there was more pain after party coming home. Some bruising on foot. Iced and took Asprin last night but nothing this am.      Reason for visit:     What part of your body is injured / painful?  right foot and ankle     What caused the injury /pain? Tripped going down the stairs     How long ago did your injury occur or pain begin? Yesterday 5/30/19    What are your most bothersome symptoms? Pain and Swelling    How would you characterize your symptom?  aching and dull    What makes your symptoms better? Ice and Ibuprofen    What makes your symptoms worse? Walking and dorsi flexion     Have you been previously seen for this problem? No    Medical History:    Any recent changes to your medical history? No    Any new medication prescribed since last visit? No    Have you had surgery on this body part before? No    Social History:    Occupation: retired     Handedness: Left    Exercise: Works out with  once a week     Review of Systems:    Do you have fever, chills, weight loss? No    Do you have any vision problems? No    Do you have any chest pain or edema? No    Do you have any shortness of breath or wheezing?  No    Do you have stomach problems? No    Do you have any numbness or focal weakness? No    Do you have diabetes? No    Do you have problems with bleeding or clotting? No    Do you have an rashes or other skin lesions? No

## 2019-05-31 NOTE — PROGRESS NOTES
CHIEF COMPLAINT:  Pain of the Right Foot and Pain of the Right Ankle       HISTORY OF PRESENT ILLNESS  Ms. Syed is a pleasant 74 year old year old female who presents to clinic today with acute right ankle and foot pain.  Tripped going downstairs in her apartment last night. Did not hear pop or snap. Went to a party and came home but there was more pain after party coming home. Some bruising on foot. Iced and took Asprin last night but nothing this am.  She states that pain remains mild but persistent with ambulation.  Swelling persists this am and bruising has formed adjacent ankle.  Improved with rest.     Additional history: as documented    MEDICAL HISTORY  Patient Active Problem List   Diagnosis     Obesity     Slow transit constipation     Nonsenile cataract     Family history of diabetes mellitus     Family history of stroke (cerebrovascular)     Family history of other cardiovascular diseases     Mitral valve disorder     CARDIOMEGALY on CXR     Hemorrhoids     Papanicolaou smear of cervix with atypical squamous cells of undetermined significance (ASC-US)     Hearing loss     Intramural leiomyoma of uterus     Abnormal glucose     Disease of lung     Coronary atherosclerosis     HYPERLIPIDEMIA LDL GOAL <100     Osteopenia     Palpitations     Dependent edema     Morbid obesity (H)     Morbid obesity, unspecified obesity type (H)     SVT (supraventricular tachycardia) (H)       Current Outpatient Medications   Medication Sig Dispense Refill     ASPIRIN 325 MG PO TBEC ONE DAILY (as Excedrin daily or baby aspirin) 100 Tab 3     FISH  MG OR CAPS 2 tablets twice daily  120 3     GLUCOSAMINE-CHONDROITIN PO Take 1 tablet by mouth daily       METAMUCIL 0.52 GM OR CAPS 3 capsules  in the evening and 2 tablets in the am  0 0     metoprolol tartrate (LOPRESSOR) 25 MG tablet TAKE 1 TABLET (25 MG) BY MOUTH DAILY 90 tablet 3     Multiple Vitamin (DAILY MULTIVITAMIN PO) Take 1 tablet by mouth daily        "simvastatin (ZOCOR) 40 MG tablet TAKE 1 TABLET (40 MG) BY MOUTH DAILY 90 tablet 3     VITAMIN B-12 PO 1 tab daily         Allergies   Allergen Reactions     Codeine Nausea     Zithromax [Azithromycin Dihydrate] Other (See Comments)     Pt declines to take given drugs hx of possible side effects        Family History   Problem Relation Age of Onset     Cardiovascular Mother         pace maker dx 85     Eye Disorder Mother         cataracts dx 85     Cerebrovascular Disease Father         dx 83     Diabetes Sister         type 2 dx 42     Depression Sister         dx 30     Gastrointestinal Disease Brother         IBS dx age ?     Lipids Sister         dx 40     Family History Negative Paternal Grandfather         fell off hay wagon -  in his 80s think MI     Family History Negative Paternal Grandmother          in her 80s      Family History Negative Maternal Grandfather          in his 80s - probably cardiovascular       Additional medical/Social/Surgical histories reviewed in [facility] and updated as appropriate.     REVIEW OF SYSTEMS (2019)  A 10-point review of systems was obtained and is negative except for as noted in the HPI.      PHYSICAL EXAM  /80   Ht 1.651 m (5' 5\")   Wt 103.6 kg (228 lb 4.8 oz)   BMI 37.99 kg/m      General  - normal appearance, in no obvious distress  CV  - normal pulses at posterior tib and dorsalis pedis  Pulm  - normal respiratory pattern, non-labored  Musculoskeletal - right ankle  - stance: Mildly antalgic gait   - inspection: moderate focal swelling laterally at ATFL only, normal bone and joint alignment, no obvious deformity  - palpation: tenderness over ATFL, no tenderness over lateral or medial malleoli, navicular, or base of 5th MT  - ROM: intact globally but limited in eversion and dorsiflexion secondary to pain  - strength: 4/5 in eversion, 5/5 in all other planes  - special tests:  pain with inversion stress  (-) anterior drawer  (-) talar tilt  (-) " Tinel's  (-) squeeze test  Neuro  - no sensory or motor deficit, grossly normal coordination, normal muscle tone  Skin  - ecchymosis and swelling overlying lateral foot-ankle junction, no warmth or induration, no obvious rash  Psych  - interactive, appropriate, normal mood and affect     ASSESSMENT & PLAN  Ms. Syed is a 74 year old year old female who presents to clinic today with acute right ankle pain after an inversion injury yesterday 5/30/19.    Diagnosis: Sprain of ATFL of right ankle.     -Ice, elevation discussed  -Lace-up ankle brace provided  -Ankle ROM exercises demonstrated  -Ibuprofen over the next 3-5 days  -Follow up 2 weeks; reassess progress and escalate rehab if doing well    It was a pleasure seeing Noemí today.    Manpreet Shannon DO, CAQSM  Primary Care Sports Medicine

## 2019-06-06 ENCOUNTER — DOCUMENTATION ONLY (OUTPATIENT)
Dept: CARE COORDINATION | Facility: CLINIC | Age: 74
End: 2019-06-06

## 2019-09-13 ENCOUNTER — TRANSFERRED RECORDS (OUTPATIENT)
Dept: HEALTH INFORMATION MANAGEMENT | Facility: CLINIC | Age: 74
End: 2019-09-13

## 2019-11-22 DIAGNOSIS — E78.5 HYPERLIPIDEMIA LDL GOAL <100: ICD-10-CM

## 2019-11-22 NOTE — TELEPHONE ENCOUNTER
"Requested Prescriptions   Pending Prescriptions Disp Refills     simvastatin (ZOCOR) 40 MG tablet [Pharmacy Med Name: SIMVASTATIN 40 MG TABLET]  Last Written Prescription Date:  11/26/2018  Last Fill Quantity: 90 tabs,  # refills: 3    Last office visit: 11/26/2018 with prescribing provider:  Wegener   Future Office Visit:   90 tablet 3     Sig: TAKE 1 TABLET BY MOUTH EVERY DAY       Statins Protocol Passed - 11/22/2019  3:47 AM        Passed - LDL on file in past 12 months     Recent Labs   Lab Test 11/26/18  1111   LDL 87             Passed - No abnormal creatine kinase in past 12 months     No lab results found.             Passed - Recent (12 mo) or future (30 days) visit within the authorizing provider's specialty     Patient has had an office visit with the authorizing provider or a provider within the authorizing providers department within the previous 12 mos or has a future within next 30 days. See \"Patient Info\" tab in inbasket, or \"Choose Columns\" in Meds & Orders section of the refill encounter.              Passed - Medication is active on med list        Passed - Patient is age 18 or older        Passed - No active pregnancy on record        Passed - No positive pregnancy test in past 12 months         "

## 2019-11-23 RX ORDER — SIMVASTATIN 40 MG
TABLET ORAL
Qty: 30 TABLET | Refills: 0 | Status: SHIPPED | OUTPATIENT
Start: 2019-11-23 | End: 2020-01-06

## 2019-11-24 NOTE — TELEPHONE ENCOUNTER
Medication is being filled for 1 time refill only due to:  Patient needs to be seen because it has been more than one year since last visit.   Please call to schedule wellness exam.

## 2019-11-29 DIAGNOSIS — I47.10 SVT (SUPRAVENTRICULAR TACHYCARDIA) (H): ICD-10-CM

## 2019-11-29 DIAGNOSIS — R00.2 PALPITATIONS: ICD-10-CM

## 2019-11-29 NOTE — TELEPHONE ENCOUNTER
"Requested Prescriptions   Pending Prescriptions Disp Refills     metoprolol tartrate (LOPRESSOR) 25 MG tablet [Pharmacy Med Name: METOPROLOL TARTRATE 25 MG TAB]  Last Written Prescription Date:  11/26/2018  Last Fill Quantity: 90 tabs,  # refills: 3   Last office visit: 11/26/2018 with prescribing provider:  Wegener   Future Office Visit:   90 tablet 3     Sig: TAKE 1 TABLET BY MOUTH EVERY DAY       Beta-Blockers Protocol Failed - 11/29/2019  2:11 AM        Failed - Recent (12 mo) or future (30 days) visit within the authorizing provider's specialty     Patient has had an office visit with the authorizing provider or a provider within the authorizing providers department within the previous 12 mos or has a future within next 30 days. See \"Patient Info\" tab in inbasket, or \"Choose Columns\" in Meds & Orders section of the refill encounter.              Passed - Blood pressure under 140/90 in past 12 months     BP Readings from Last 3 Encounters:   05/31/19 122/80   11/26/18 108/71   09/29/17 115/66                 Passed - Patient is age 6 or older        Passed - Medication is active on med list         "

## 2019-12-01 RX ORDER — METOPROLOL TARTRATE 25 MG/1
TABLET, FILM COATED ORAL
Qty: 30 TABLET | Refills: 0 | Status: SHIPPED | OUTPATIENT
Start: 2019-12-01 | End: 2020-01-06

## 2019-12-01 NOTE — TELEPHONE ENCOUNTER
,  --Please send letter to  patient and ask her to schedule an annual office visit.    --VM was left for patient 11/25 and she has not responded or scheduled.  --A refill order for below medication was sent to the pharmacy.    Prescription approved per [facility] Refill Protocol.  Humera Mcrae RN

## 2020-01-06 DIAGNOSIS — I47.10 SVT (SUPRAVENTRICULAR TACHYCARDIA) (H): ICD-10-CM

## 2020-01-06 DIAGNOSIS — E78.5 HYPERLIPIDEMIA LDL GOAL <100: ICD-10-CM

## 2020-01-06 DIAGNOSIS — R00.2 PALPITATIONS: ICD-10-CM

## 2020-01-06 NOTE — TELEPHONE ENCOUNTER
Routing refill request to provider for review/approval because:  Marianne given x1 and patient did not follow up, please advise

## 2020-01-07 RX ORDER — METOPROLOL TARTRATE 25 MG/1
TABLET, FILM COATED ORAL
Qty: 30 TABLET | Refills: 0 | Status: SHIPPED | OUTPATIENT
Start: 2020-01-07 | End: 2020-02-02

## 2020-01-07 RX ORDER — SIMVASTATIN 40 MG
TABLET ORAL
Qty: 30 TABLET | Refills: 0 | Status: SHIPPED | OUTPATIENT
Start: 2020-01-07 | End: 2020-02-02

## 2020-02-01 DIAGNOSIS — E78.5 HYPERLIPIDEMIA LDL GOAL <100: ICD-10-CM

## 2020-02-01 DIAGNOSIS — R00.2 PALPITATIONS: ICD-10-CM

## 2020-02-01 DIAGNOSIS — I47.10 SVT (SUPRAVENTRICULAR TACHYCARDIA) (H): ICD-10-CM

## 2020-02-03 ENCOUNTER — TELEPHONE (OUTPATIENT)
Dept: FAMILY MEDICINE | Facility: CLINIC | Age: 75
End: 2020-02-03

## 2020-02-03 RX ORDER — METOPROLOL TARTRATE 25 MG/1
TABLET, FILM COATED ORAL
Qty: 15 TABLET | Refills: 0 | Status: SHIPPED | OUTPATIENT
Start: 2020-02-03

## 2020-02-03 RX ORDER — SIMVASTATIN 40 MG
TABLET ORAL
Qty: 15 TABLET | Refills: 0 | Status: SHIPPED | OUTPATIENT
Start: 2020-02-03

## 2020-02-03 NOTE — TELEPHONE ENCOUNTER
Left message on machine to call back.  Ask to speak to an RN, let them know it's a return call.    Leave a number and time that you can be reached.   Yolanda Rodgers RN

## 2020-02-03 NOTE — TELEPHONE ENCOUNTER
"Requested Prescriptions   Pending Prescriptions Disp Refills     metoprolol tartrate (LOPRESSOR) 25 MG tablet [Pharmacy Med Name: METOPROLOL TARTRATE 25 MG TAB] 30 tablet 0     Sig: TAKE 1 TABLET BY MOUTH EVERY DAY         Last Written Prescription Date:  1/7/20  Last Fill Quantity: 30,   # refills: 0  Last Office Visit: 11/26/18  Future Office visit:       Routing refill request to provider for review/approval because:  Marianne given x1 and patient did not follow up, please advise            Beta-Blockers Protocol Failed - 2/1/2020 10:49 AM        Failed - Recent (12 mo) or future (30 days) visit within the authorizing provider's specialty     Patient has had an office visit with the authorizing provider or a provider within the authorizing providers department within the previous 12 mos or has a future within next 30 days. See \"Patient Info\" tab in inbasket, or \"Choose Columns\" in Meds & Orders section of the refill encounter.              Passed - Blood pressure under 140/90 in past 12 months     BP Readings from Last 3 Encounters:   05/31/19 122/80   11/26/18 108/71   09/29/17 115/66                 Passed - Patient is age 6 or older        Passed - Medication is active on med list        simvastatin (ZOCOR) 40 MG tablet [Pharmacy Med Name: SIMVASTATIN 40 MG TABLET] 30 tablet 0     Sig: TAKE 1 TABLET BY MOUTH EVERY DAY   Last Written Prescription Date:  1/7/20  Last Fill Quantity: 30,   # refills: 0  Last Office Visit: 11/26/18  Future Office visit:       Routing refill request to provider for review/approval because:  Marianne given x1 and patient did not follow up, please advise    Statins Protocol Failed - 2/1/2020 10:49 AM        Failed - LDL on file in past 12 months     Recent Labs   Lab Test 11/26/18  1111   LDL 87             Failed - Recent (12 mo) or future (30 days) visit within the authorizing provider's specialty     Patient has had an office visit with the authorizing provider or a provider within the " "authorizing providers department within the previous 12 mos or has a future within next 30 days. See \"Patient Info\" tab in inbasket, or \"Choose Columns\" in Meds & Orders section of the refill encounter.              Passed - No abnormal creatine kinase in past 12 months     No lab results found.             Passed - Medication is active on med list        Passed - Patient is age 18 or older        Passed - No active pregnancy on record        Passed - No positive pregnancy test in past 12 months            HW reception - please encourage office visit for future refills  "

## 2020-02-03 NOTE — TELEPHONE ENCOUNTER
Reason for Call:  Other call back    Detailed comments: PT states changed Dr. To non [place] doctor and getting medications [facility] new dr [facility], said she keeps having issues,and would like to discuss.    Phone Number Patient can be reached at: Home number on file [phone] (home)    Best Time: asap    Can we leave a detailed message on this number? YES    Call taken on 2/3/2020 at 2:57 PM by Joanna Hazel

## 2020-02-05 NOTE — TELEPHONE ENCOUNTER
Writer called patient who stated she does not have medication questions but does need Medical Records phone number.    Writer reviewed Medical Records phone number with patient: [phone].    Patient verbalized understanding.  SHE PhilipN, RN

## 2020-02-28 DIAGNOSIS — E78.5 HYPERLIPIDEMIA LDL GOAL <100: ICD-10-CM

## 2020-02-28 DIAGNOSIS — I47.10 SVT (SUPRAVENTRICULAR TACHYCARDIA) (H): ICD-10-CM

## 2020-02-28 DIAGNOSIS — R00.2 PALPITATIONS: ICD-10-CM

## 2020-02-29 NOTE — TELEPHONE ENCOUNTER
"Requested Prescriptions   Pending Prescriptions Disp Refills     simvastatin (ZOCOR) 40 MG tablet [Pharmacy Med Name: SIMVASTATIN 40 MG TABLET]  Last Written Prescription Date:  2/3/2020  Last Fill Quantity: 15 tabs,  # refills: 0   Last office visit: 11/26/2018 with prescribing provider:  Wegener   Future Office Visit:   15 tablet 0     Sig: TAKE 1 TABLET BY MOUTH EVERY DAY       Statins Protocol Failed - 2/28/2020  4:01 PM        Failed - LDL on file in past 12 months     Recent Labs   Lab Test 11/26/18  1111   LDL 87             Failed - Recent (12 mo) or future (30 days) visit within the authorizing provider's specialty     Patient has had an office visit with the authorizing provider or a provider within the authorizing providers department within the previous 12 mos or has a future within next 30 days. See \"Patient Info\" tab in inbasket, or \"Choose Columns\" in Meds & Orders section of the refill encounter.              Passed - No abnormal creatine kinase in past 12 months     No lab results found.             Passed - Medication is active on med list        Passed - Patient is age 18 or older        Passed - No active pregnancy on record        Passed - No positive pregnancy test in past 12 months        metoprolol tartrate (LOPRESSOR) 25 MG tablet [Pharmacy Med Name: METOPROLOL TARTRATE 25 MG TAB]  Last Written Prescription Date:  2/3/2020  Last Fill Quantity: 15 tabs,  # refills: 0   Last office visit: 11/26/2018 with prescribing provider:  Wegener   Future Office Visit:   15 tablet 0     Sig: TAKE 1 TABLET BY MOUTH EVERY DAY       Beta-Blockers Protocol Failed - 2/28/2020  4:01 PM        Failed - Recent (12 mo) or future (30 days) visit within the authorizing provider's specialty     Patient has had an office visit with the authorizing provider or a provider within the authorizing providers department within the previous 12 mos or has a future within next 30 days. See \"Patient Info\" tab in inbasket, or " "\"Choose Columns\" in Meds & Orders section of the refill encounter.              Passed - Blood pressure under 140/90 in past 12 months     BP Readings from Last 3 Encounters:   05/31/19 122/80   11/26/18 108/71   09/29/17 115/66                 Passed - Patient is age 6 or older        Passed - Medication is active on med list         "

## 2020-03-04 RX ORDER — METOPROLOL TARTRATE 25 MG/1
TABLET, FILM COATED ORAL
Qty: 15 TABLET | Refills: 0 | OUTPATIENT
Start: 2020-03-04

## 2020-03-04 RX ORDER — SIMVASTATIN 40 MG
TABLET ORAL
Qty: 15 TABLET | Refills: 0 | OUTPATIENT
Start: 2020-03-04

## 2020-03-04 NOTE — TELEPHONE ENCOUNTER
"--Last office visit 11/26/18.  --Last 3 refill orders sent were lilli refills and the last order had tapered dispense.    See 2/3/2020 encounter notes ---\"PT states changed DrFiona To non [facility] doctor and getting medications from new dr already [facility], said she keeps having issues,and would like to discuss.\"         Refusal reason: OTHER (PT STATES SHE IS NO LONGER [facility] PATIENT.)  "

## 2021-02-14 ENCOUNTER — HEALTH MAINTENANCE LETTER (OUTPATIENT)
Age: 76
End: 2021-02-14

## 2021-09-19 ENCOUNTER — HEALTH MAINTENANCE LETTER (OUTPATIENT)
Age: 76
End: 2021-09-19

## 2022-03-06 ENCOUNTER — HEALTH MAINTENANCE LETTER (OUTPATIENT)
Age: 77
End: 2022-03-06

## 2022-11-14 NOTE — TELEPHONE ENCOUNTER
"Requested Prescriptions     Pending Prescriptions Disp Refills     metoprolol tartrate (LOPRESSOR) 25 MG tablet 30 tablet 0     Sig: TAKE 1 TABLET BY MOUTH EVERY DAY  Last Written Prescription Date:  12/1/2019  Last Fill Quantity: 30 tablet,  # refills: 0   Last Office Visit: 11/26/2018   Future Office Visit:            Beta-Blockers Protocol Failed - 1/6/2020 10:09 AM        Failed - Recent (12 mo) or future (30 days) visit within the authorizing provider's specialty     Patient has had an office visit with the authorizing provider or a provider within the authorizing providers department within the previous 12 mos or has a future within next 30 days. See \"Patient Info\" tab in inbasket, or \"Choose Columns\" in Meds & Orders section of the refill encounter.            Passed - Blood pressure under 140/90 in past 12 months     BP Readings from Last 3 Encounters:   05/31/19 122/80   11/26/18 108/71   09/29/17 115/66           Passed - Patient is age 6 or older        Passed - Medication is active on med list     _________________________________________________________________       simvastatin (ZOCOR) 40 MG tablet 30 tablet 0     Sig: TAKE 1 TABLET BY MOUTH EVERY DAY  Last Written Prescription Date:  11/23/2019  Last Fill Quantity: 30 tablet,  # refills: 0   Last Office Visit: 11/26/2018   Future Office Visit:            Statins Protocol Failed - 1/6/2020 10:09 AM        Failed - LDL on file in past 12 months     Recent Labs   Lab Test 11/26/18  1111   LDL 87           Failed - Recent (12 mo) or future (30 days) visit within the authorizing provider's specialty     Patient has had an office visit with the authorizing provider or a provider within the authorizing providers department within the previous 12 mos or has a future within next 30 days. See \"Patient Info\" tab in inbasket, or \"Choose Columns\" in Meds & Orders section of the refill encounter.            Passed - No abnormal creatine kinase in past 12 months     " No lab results found.           Passed - Medication is active on med list        Passed - Patient is age 18 or older        Passed - No active pregnancy on record        Passed - No positive pregnancy test in past 12 months           Provider Procedure Text (D): After obtaining clear surgical margins the defect was repaired by another provider.

## 2022-11-21 ENCOUNTER — HEALTH MAINTENANCE LETTER (OUTPATIENT)
Age: 77
End: 2022-11-21

## 2023-04-16 ENCOUNTER — HEALTH MAINTENANCE LETTER (OUTPATIENT)
Age: 78
End: 2023-04-16